# Patient Record
Sex: MALE
[De-identification: names, ages, dates, MRNs, and addresses within clinical notes are randomized per-mention and may not be internally consistent; named-entity substitution may affect disease eponyms.]

---

## 2024-07-18 PROBLEM — Z00.00 ENCOUNTER FOR PREVENTIVE HEALTH EXAMINATION: Status: ACTIVE | Noted: 2024-07-18

## 2024-07-22 ENCOUNTER — NON-APPOINTMENT (OUTPATIENT)
Age: 64
End: 2024-07-22

## 2024-07-23 ENCOUNTER — APPOINTMENT (OUTPATIENT)
Dept: OTOLARYNGOLOGY | Facility: CLINIC | Age: 64
End: 2024-07-23

## 2024-07-23 ENCOUNTER — NON-APPOINTMENT (OUTPATIENT)
Age: 64
End: 2024-07-23

## 2024-07-23 VITALS
HEART RATE: 69 BPM | BODY MASS INDEX: 32.58 KG/M2 | OXYGEN SATURATION: 95 % | TEMPERATURE: 98.2 F | HEIGHT: 69 IN | SYSTOLIC BLOOD PRESSURE: 157 MMHG | DIASTOLIC BLOOD PRESSURE: 92 MMHG | WEIGHT: 220 LBS

## 2024-07-23 DIAGNOSIS — H61.23 IMPACTED CERUMEN, BILATERAL: ICD-10-CM

## 2024-07-23 DIAGNOSIS — Z78.9 OTHER SPECIFIED HEALTH STATUS: ICD-10-CM

## 2024-07-23 DIAGNOSIS — Z86.79 PERSONAL HISTORY OF OTHER DISEASES OF THE CIRCULATORY SYSTEM: ICD-10-CM

## 2024-07-23 DIAGNOSIS — Z80.3 FAMILY HISTORY OF MALIGNANT NEOPLASM OF BREAST: ICD-10-CM

## 2024-07-23 DIAGNOSIS — K21.9 GASTRO-ESOPHAGEAL REFLUX DISEASE W/OUT ESOPHAGITIS: ICD-10-CM

## 2024-07-23 DIAGNOSIS — R49.0 DYSPHONIA: ICD-10-CM

## 2024-07-23 PROCEDURE — 69210 REMOVE IMPACTED EAR WAX UNI: CPT

## 2024-07-23 PROCEDURE — 99205 OFFICE O/P NEW HI 60 MIN: CPT | Mod: 25

## 2024-07-23 PROCEDURE — 31579 LARYNGOSCOPY TELESCOPIC: CPT

## 2024-07-23 RX ORDER — METHYLPREDNISOLONE 4 MG/1
4 TABLET ORAL
Qty: 1 | Refills: 0 | Status: ACTIVE | COMMUNITY
Start: 2024-07-23 | End: 1900-01-01

## 2024-07-23 RX ORDER — LOSARTAN POTASSIUM 25 MG/1
25 TABLET, FILM COATED ORAL
Refills: 0 | Status: ACTIVE | COMMUNITY

## 2024-07-23 RX ORDER — FAMOTIDINE 20 MG/1
20 TABLET, FILM COATED ORAL
Qty: 30 | Refills: 3 | Status: ACTIVE | COMMUNITY
Start: 2024-07-23 | End: 1900-01-01

## 2024-07-23 RX ORDER — ESOMEPRAZOLE MAGNESIUM 40 MG/1
40 CAPSULE, DELAYED RELEASE ORAL
Qty: 30 | Refills: 0 | Status: ACTIVE | COMMUNITY
Start: 2024-07-23 | End: 1900-01-01

## 2024-07-23 RX ORDER — HYDROCHLOROTHIAZIDE 25 MG/1
25 TABLET ORAL
Refills: 0 | Status: ACTIVE | COMMUNITY

## 2024-07-24 ENCOUNTER — RX RENEWAL (OUTPATIENT)
Age: 64
End: 2024-07-24

## 2024-07-24 RX ORDER — OMEPRAZOLE 40 MG/1
40 CAPSULE, DELAYED RELEASE ORAL
Qty: 90 | Refills: 0 | Status: ACTIVE | COMMUNITY
Start: 2024-07-23 | End: 1900-01-01

## 2024-07-24 NOTE — ASSESSMENT
[FreeTextEntry1] : - 7/23/24: 63 y/o M presents with hoarseness for the past 3 weeks (he cannot tell if it was gradual/ sudden), worse with overuse. He had possible uri prior this. Based on history and physical exam, I do believe there is a component of laryngopharyngeal reflux. At this time I am recommending dietary and behavioral change to reduce acid in the diet. I am also recommending omeprazole as well famotidine for a 3 months trial. He prefers to take Nexium (he had been on this previously. Follow up with GI as scheduled tomorrow. I suspect he likely had laryngitis. I am recommending Medrol dose pack.  I am also recommending consultation with speech language pathology and speech therapy. Follow up in 6-8 weeks.  On exam there is evidence of cerumen impaction. This was cleaned today without issue. Patient noted immediate improvement back to baseline.   -Dietary and behavioral modification to reduce acid reflux, handout given -Omeprazole qd as well as Famotidine qhs -Voice hygiene, increase hydration, sips of water throughout the day, avoid throat clearing -Medrol dose pack -Consultation with speech language pathology and speech therapy  -Follow up in 6-8 weeks

## 2024-07-24 NOTE — PHYSICAL EXAM
[Normal] : mucosa is normal [Midline] : trachea located in midline position [FreeTextEntry1] : speech is loquacious, good projection and range [de-identified] : bilateral cerumen impaction- cleaned away with suction/curette with no issues.

## 2024-07-24 NOTE — HISTORY OF PRESENT ILLNESS
[de-identified] : 7/23/24: 65 y/o M presents with hoarseness for the past 3 weeks (he cannot tell if it was gradual/ sudden), worse with overuse. Today he feels his voice is back to his baseline. He had possible uri prior this. No issues eating/ chewing/ swallowing. No cough or issues breathing. No regurgitation of food. 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 He has history of H pylori treated with abx several years ago, previously treated for reflux several years ago.  Diet: +tomato, onion, spicy foods -coffee, tea, soda, chocolate, mint, citrus, garlic, blueberry, carbonated beverages water intake: 2 glasses  late night eating: some

## 2024-07-24 NOTE — PROCEDURE
[FreeTextEntry3] :  Ear cleaning: Cerumen Removal/Ear Cleaning for Otitis Externa Pre-operative Diagnosis: Bilateral Cerumen Impaction Post-operative Diagnosis: Same Procedure: Binocular microscopy with cerumen removal- [id number] Procedure Details: The patient was placed in the supine position. The operating microscope was positioned. I then placed the ear speculum in the EAC. Cerumen was then removed using a mixture of otologic curettes, and suction. The TM was noted to be intact. I then performed the procedure of the opposite ear in similar fashion. The patient tolerated procedure well. Findings: Bilateral Ear Canal - normal Bilateral Tympanic Membrane - normal Recommendations: Debrox Complications: None [de-identified] : -   Pre-operative Diagnosis: Dysphonia,  Post-operative Diagnosis: laryngopharyngeal reflux, possible bilateral sulcus, supraglottic compression   Anesthesia: Topical - 1 % Lidocaine/Phenylephrine Procedure: Flexible Laryngoscopy with Stroboscopy - CPT 13557   Procedure Details: The patient was placed in the sitting position. After decongestant and anesthesia were applied the laryngoscope was passed. The nasal cavities, nasopharynx, oropharynx, hypopharynx, and larynx were all examined. Vocal folds were examined during respiration and phonation. The following findings were noted:   Findings: Nose: Septum is midline, turbinates are normal, nasal airways patent, mucosa normal Nasopharynx: Adenoids normal, no masses, eustachian tube normal Oropharynx: Pharyngeal walls symmetric and without lesion. Tonsils/fossae symmetric Hypopharynx: Hypopharynx and pyriform sinuses without lesion. No masses or asymmetry. Larynx: Epiglottis and aryepiglottic folds were sharp and crisp bilaterally. Bilateral false vocal folds normal appearance. Airway was widely patent. + postcricoid edema, erythema of the vocal process   Strobe Exam Ratings   TVF Appearance: normal, mild erythema of the vocal process, possible bilateral sulcus   TVF Mobility: normal Edema/hypertrophy: normal, + postcricoid edema Mucus on TVF: normal Glottic Closure: normal/adequate Mucosal Wave: normal Amplitude of Vibration: normal Phase: symmetric Supraglottic Hyperfunction: supraglottic compression  Other Findings: none   Condition: Stable. Patient tolerated procedure well.   Complications: None

## 2024-07-24 NOTE — HISTORY OF PRESENT ILLNESS
[de-identified] : 7/23/24: 63 y/o M presents with hoarseness for the past 3 weeks (he cannot tell if it was gradual/ sudden), worse with overuse. Today he feels his voice is back to his baseline. He had possible uri prior this. No issues eating/ chewing/ swallowing. No cough or issues breathing. No regurgitation of food. 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 He has history of H pylori treated with abx several years ago, previously treated for reflux several years ago.  Diet: +tomato, onion, spicy foods -coffee, tea, soda, chocolate, mint, citrus, garlic, blueberry, carbonated beverages water intake: 2 glasses  late night eating: some

## 2024-07-24 NOTE — ASSESSMENT
[FreeTextEntry1] : - 7/23/24: 65 y/o M presents with hoarseness for the past 3 weeks (he cannot tell if it was gradual/ sudden), worse with overuse. He had possible uri prior this. Based on history and physical exam, I do believe there is a component of laryngopharyngeal reflux. At this time I am recommending dietary and behavioral change to reduce acid in the diet. I am also recommending omeprazole as well famotidine for a 3 months trial. He prefers to take Nexium (he had been on this previously. Follow up with GI as scheduled tomorrow. I suspect he likely had laryngitis. I am recommending Medrol dose pack.  I am also recommending consultation with speech language pathology and speech therapy. Follow up in 6-8 weeks.  On exam there is evidence of cerumen impaction. This was cleaned today without issue. Patient noted immediate improvement back to baseline.   -Dietary and behavioral modification to reduce acid reflux, handout given -Omeprazole qd as well as Famotidine qhs -Voice hygiene, increase hydration, sips of water throughout the day, avoid throat clearing -Medrol dose pack -Consultation with speech language pathology and speech therapy  -Follow up in 6-8 weeks

## 2024-07-24 NOTE — PROCEDURE
[FreeTextEntry3] :  Ear cleaning: Cerumen Removal/Ear Cleaning for Otitis Externa Pre-operative Diagnosis: Bilateral Cerumen Impaction Post-operative Diagnosis: Same Procedure: Binocular microscopy with cerumen removal- [id number] Procedure Details: The patient was placed in the supine position. The operating microscope was positioned. I then placed the ear speculum in the EAC. Cerumen was then removed using a mixture of otologic curettes, and suction. The TM was noted to be intact. I then performed the procedure of the opposite ear in similar fashion. The patient tolerated procedure well. Findings: Bilateral Ear Canal - normal Bilateral Tympanic Membrane - normal Recommendations: Debrox Complications: None [de-identified] : -   Pre-operative Diagnosis: Dysphonia,  Post-operative Diagnosis: laryngopharyngeal reflux, possible bilateral sulcus, supraglottic compression   Anesthesia: Topical - 1 % Lidocaine/Phenylephrine Procedure: Flexible Laryngoscopy with Stroboscopy - CPT 43961   Procedure Details: The patient was placed in the sitting position. After decongestant and anesthesia were applied the laryngoscope was passed. The nasal cavities, nasopharynx, oropharynx, hypopharynx, and larynx were all examined. Vocal folds were examined during respiration and phonation. The following findings were noted:   Findings: Nose: Septum is midline, turbinates are normal, nasal airways patent, mucosa normal Nasopharynx: Adenoids normal, no masses, eustachian tube normal Oropharynx: Pharyngeal walls symmetric and without lesion. Tonsils/fossae symmetric Hypopharynx: Hypopharynx and pyriform sinuses without lesion. No masses or asymmetry. Larynx: Epiglottis and aryepiglottic folds were sharp and crisp bilaterally. Bilateral false vocal folds normal appearance. Airway was widely patent. + postcricoid edema, erythema of the vocal process   Strobe Exam Ratings   TVF Appearance: normal, mild erythema of the vocal process, possible bilateral sulcus   TVF Mobility: normal Edema/hypertrophy: normal, + postcricoid edema Mucus on TVF: normal Glottic Closure: normal/adequate Mucosal Wave: normal Amplitude of Vibration: normal Phase: symmetric Supraglottic Hyperfunction: supraglottic compression  Other Findings: none   Condition: Stable. Patient tolerated procedure well.   Complications: None

## 2024-07-24 NOTE — PHYSICAL EXAM
[Normal] : mucosa is normal [Midline] : trachea located in midline position [FreeTextEntry1] : speech is loquacious, good projection and range [de-identified] : bilateral cerumen impaction- cleaned away with suction/curette with no issues.

## 2024-08-15 ENCOUNTER — EMERGENCY (EMERGENCY)
Facility: HOSPITAL | Age: 64
LOS: 1 days | Discharge: HOME | End: 2024-08-15
Attending: EMERGENCY MEDICINE | Admitting: EMERGENCY MEDICINE
Payer: COMMERCIAL

## 2024-08-15 VITALS
SYSTOLIC BLOOD PRESSURE: 154 MMHG | HEART RATE: 98 BPM | RESPIRATION RATE: 18 BRPM | OXYGEN SATURATION: 98 % | TEMPERATURE: 98 F | DIASTOLIC BLOOD PRESSURE: 89 MMHG

## 2024-08-15 VITALS
RESPIRATION RATE: 18 BRPM | DIASTOLIC BLOOD PRESSURE: 82 MMHG | HEIGHT: 69 IN | HEART RATE: 101 BPM | WEIGHT: 223.99 LBS | SYSTOLIC BLOOD PRESSURE: 172 MMHG | TEMPERATURE: 98 F | OXYGEN SATURATION: 96 %

## 2024-08-15 DIAGNOSIS — R10.11 RIGHT UPPER QUADRANT PAIN: ICD-10-CM

## 2024-08-15 DIAGNOSIS — I10 ESSENTIAL (PRIMARY) HYPERTENSION: ICD-10-CM

## 2024-08-15 LAB
ALBUMIN SERPL ELPH-MCNC: 4.4 G/DL — SIGNIFICANT CHANGE UP (ref 3.3–5)
ALP SERPL-CCNC: 66 U/L — SIGNIFICANT CHANGE UP (ref 40–120)
ALT FLD-CCNC: 20 U/L — SIGNIFICANT CHANGE UP (ref 10–45)
ANION GAP SERPL CALC-SCNC: 10 MMOL/L — SIGNIFICANT CHANGE UP (ref 5–17)
APTT BLD: 32.9 SEC — SIGNIFICANT CHANGE UP (ref 24.5–35.6)
AST SERPL-CCNC: 16 U/L — SIGNIFICANT CHANGE UP (ref 10–40)
BASOPHILS # BLD AUTO: 0.04 K/UL — SIGNIFICANT CHANGE UP (ref 0–0.2)
BASOPHILS NFR BLD AUTO: 0.5 % — SIGNIFICANT CHANGE UP (ref 0–2)
BILIRUB SERPL-MCNC: 0.3 MG/DL — SIGNIFICANT CHANGE UP (ref 0.2–1.2)
BUN SERPL-MCNC: 20 MG/DL — SIGNIFICANT CHANGE UP (ref 7–23)
CALCIUM SERPL-MCNC: 10.6 MG/DL — HIGH (ref 8.4–10.5)
CHLORIDE SERPL-SCNC: 106 MMOL/L — SIGNIFICANT CHANGE UP (ref 96–108)
CO2 SERPL-SCNC: 25 MMOL/L — SIGNIFICANT CHANGE UP (ref 22–31)
CREAT SERPL-MCNC: 1.12 MG/DL — SIGNIFICANT CHANGE UP (ref 0.5–1.3)
EGFR: 74 ML/MIN/1.73M2 — SIGNIFICANT CHANGE UP
EGFR: 74 ML/MIN/1.73M2 — SIGNIFICANT CHANGE UP
EOSINOPHIL # BLD AUTO: 0.08 K/UL — SIGNIFICANT CHANGE UP (ref 0–0.5)
EOSINOPHIL NFR BLD AUTO: 0.9 % — SIGNIFICANT CHANGE UP (ref 0–6)
GLUCOSE SERPL-MCNC: 93 MG/DL — SIGNIFICANT CHANGE UP (ref 70–99)
HCT VFR BLD CALC: 43 % — SIGNIFICANT CHANGE UP (ref 39–50)
HGB BLD-MCNC: 13.7 G/DL — SIGNIFICANT CHANGE UP (ref 13–17)
IMM GRANULOCYTES NFR BLD AUTO: 0.3 % — SIGNIFICANT CHANGE UP (ref 0–0.9)
INR BLD: 0.94 — SIGNIFICANT CHANGE UP (ref 0.85–1.18)
LIDOCAIN IGE QN: 15 U/L — SIGNIFICANT CHANGE UP (ref 7–60)
LYMPHOCYTES # BLD AUTO: 1.57 K/UL — SIGNIFICANT CHANGE UP (ref 1–3.3)
LYMPHOCYTES # BLD AUTO: 18.2 % — SIGNIFICANT CHANGE UP (ref 13–44)
MAGNESIUM SERPL-MCNC: 2.1 MG/DL — SIGNIFICANT CHANGE UP (ref 1.6–2.6)
MCHC RBC-ENTMCNC: 26.3 PG — LOW (ref 27–34)
MCHC RBC-ENTMCNC: 31.9 GM/DL — LOW (ref 32–36)
MCV RBC AUTO: 82.5 FL — SIGNIFICANT CHANGE UP (ref 80–100)
MONOCYTES # BLD AUTO: 0.62 K/UL — SIGNIFICANT CHANGE UP (ref 0–0.9)
MONOCYTES NFR BLD AUTO: 7.2 % — SIGNIFICANT CHANGE UP (ref 2–14)
NEUTROPHILS # BLD AUTO: 6.31 K/UL — SIGNIFICANT CHANGE UP (ref 1.8–7.4)
NEUTROPHILS NFR BLD AUTO: 72.9 % — SIGNIFICANT CHANGE UP (ref 43–77)
NRBC # BLD: 0 /100 WBCS — SIGNIFICANT CHANGE UP (ref 0–0)
NRBC BLD-RTO: 0 /100 WBCS — SIGNIFICANT CHANGE UP (ref 0–0)
NT-PROBNP SERPL-SCNC: 59 PG/ML — SIGNIFICANT CHANGE UP (ref 0–300)
PLATELET # BLD AUTO: 355 K/UL — SIGNIFICANT CHANGE UP (ref 150–400)
POTASSIUM SERPL-MCNC: 4.2 MMOL/L — SIGNIFICANT CHANGE UP (ref 3.5–5.3)
POTASSIUM SERPL-SCNC: 4.2 MMOL/L — SIGNIFICANT CHANGE UP (ref 3.5–5.3)
PROT SERPL-MCNC: 7.9 G/DL — SIGNIFICANT CHANGE UP (ref 6–8.3)
PROTHROM AB SERPL-ACNC: 10.7 SEC — SIGNIFICANT CHANGE UP (ref 9.5–13)
RBC # BLD: 5.21 M/UL — SIGNIFICANT CHANGE UP (ref 4.2–5.8)
RBC # FLD: 14 % — SIGNIFICANT CHANGE UP (ref 10.3–14.5)
SODIUM SERPL-SCNC: 141 MMOL/L — SIGNIFICANT CHANGE UP (ref 135–145)
TROPONIN T, HIGH SENSITIVITY RESULT: <6 NG/L — SIGNIFICANT CHANGE UP (ref 0–51)
WBC # BLD: 8.65 K/UL — SIGNIFICANT CHANGE UP (ref 3.8–10.5)
WBC # FLD AUTO: 8.65 K/UL — SIGNIFICANT CHANGE UP (ref 3.8–10.5)

## 2024-08-15 PROCEDURE — 36415 COLL VENOUS BLD VENIPUNCTURE: CPT

## 2024-08-15 PROCEDURE — 83735 ASSAY OF MAGNESIUM: CPT

## 2024-08-15 PROCEDURE — 85025 COMPLETE CBC W/AUTO DIFF WBC: CPT

## 2024-08-15 PROCEDURE — 76705 ECHO EXAM OF ABDOMEN: CPT

## 2024-08-15 PROCEDURE — 80053 COMPREHEN METABOLIC PANEL: CPT

## 2024-08-15 PROCEDURE — 76705 ECHO EXAM OF ABDOMEN: CPT | Mod: 26

## 2024-08-15 PROCEDURE — 71046 X-RAY EXAM CHEST 2 VIEWS: CPT | Mod: 26

## 2024-08-15 PROCEDURE — 85610 PROTHROMBIN TIME: CPT

## 2024-08-15 PROCEDURE — 85730 THROMBOPLASTIN TIME PARTIAL: CPT

## 2024-08-15 PROCEDURE — 99285 EMERGENCY DEPT VISIT HI MDM: CPT

## 2024-08-15 PROCEDURE — 74177 CT ABD & PELVIS W/CONTRAST: CPT | Mod: 26,MC

## 2024-08-15 PROCEDURE — 84484 ASSAY OF TROPONIN QUANT: CPT

## 2024-08-15 PROCEDURE — 83880 ASSAY OF NATRIURETIC PEPTIDE: CPT

## 2024-08-15 PROCEDURE — 99284 EMERGENCY DEPT VISIT MOD MDM: CPT | Mod: 25

## 2024-08-15 PROCEDURE — 74177 CT ABD & PELVIS W/CONTRAST: CPT | Mod: MC

## 2024-08-15 PROCEDURE — 83690 ASSAY OF LIPASE: CPT

## 2024-08-15 PROCEDURE — 71046 X-RAY EXAM CHEST 2 VIEWS: CPT

## 2024-08-15 RX ORDER — IOHEXOL 350 MG/ML
30 INJECTION, SOLUTION INTRAVENOUS ONCE
Refills: 0 | Status: COMPLETED | OUTPATIENT
Start: 2024-08-15 | End: 2024-08-15

## 2024-08-15 RX ADMIN — IOHEXOL 30 MILLILITER(S): 350 INJECTION, SOLUTION INTRAVENOUS at 03:10

## 2024-10-10 ENCOUNTER — APPOINTMENT (OUTPATIENT)
Dept: OTOLARYNGOLOGY | Facility: CLINIC | Age: 64
End: 2024-10-10